# Patient Record
Sex: MALE | Race: BLACK OR AFRICAN AMERICAN | NOT HISPANIC OR LATINO | Employment: UNEMPLOYED | URBAN - METROPOLITAN AREA
[De-identification: names, ages, dates, MRNs, and addresses within clinical notes are randomized per-mention and may not be internally consistent; named-entity substitution may affect disease eponyms.]

---

## 2019-12-19 ENCOUNTER — HOSPITAL ENCOUNTER (EMERGENCY)
Facility: HOSPITAL | Age: 24
Discharge: LEFT BEFORE DISCHARGE | End: 2019-12-19
Attending: EMERGENCY MEDICINE | Admitting: EMERGENCY MEDICINE

## 2019-12-19 VITALS
TEMPERATURE: 96.4 F | OXYGEN SATURATION: 95 % | RESPIRATION RATE: 18 BRPM | HEIGHT: 70 IN | SYSTOLIC BLOOD PRESSURE: 124 MMHG | DIASTOLIC BLOOD PRESSURE: 70 MMHG | HEART RATE: 86 BPM

## 2019-12-19 DIAGNOSIS — K08.89 PAIN, DENTAL: Primary | ICD-10-CM

## 2019-12-19 PROCEDURE — 99282 EMERGENCY DEPT VISIT SF MDM: CPT

## 2019-12-20 NOTE — ED NOTES
WENT TO SPEAK WITH THE PT.  HE NOR THE WOMAN HE WAS WITH ARE IN THE ROOM OR TO BE FOUND AROUND THE ED. PT ELOPED AND DID NOT RECEIVE AVS OR DISCHARGE INSTRUCTIONS      Samson Gutierrez RN  12/19/19 0749

## 2019-12-20 NOTE — ED PROVIDER NOTES
I went to see patient and patient was not present in the room.  There is no clothing or personal effects in the room.  I suspect that patient has eloped from the emergency department.     Rom Arenas MD  12/19/19 0595

## 2019-12-20 NOTE — ED NOTES
Pt ambulatory to triage with c/o feeling like his tooth is falling out - states thinks it is his left upper wisdom tooth, started just prior to arrival to ER.      America Acevedo, RN  12/19/19 1574

## 2024-08-28 NOTE — ED PROVIDER NOTES
" EMERGENCY DEPARTMENT ENCOUNTER    Room Number:  11/11  Date of encounter:  12/19/2019  PCP: Provider, No Known  Historian: patient      HPI:  Chief Complaint: \"my tooth needs to come out\"  Context: Adalberto Leary is a 24 y.o. male who presents to the ED c/o a broken wisdom tooth that has been present for one week and tonight he was eating [employer] and it broke and is now \"moving\" in his mouth.  He states he needs this tooth removed tonight.  He has a dentist but has not seen an oral surgeon about this manner.  He denies fever, gum pain or other dental issues prior to this.  Made worse by eating.       MEDICAL HISTORY REVIEW      PAST MEDICAL HISTORY  Active Ambulatory Problems     Diagnosis Date Noted   • No Active Ambulatory Problems     Resolved Ambulatory Problems     Diagnosis Date Noted   • No Resolved Ambulatory Problems     No Additional Past Medical History         PAST SURGICAL HISTORY  No past surgical history on file.      FAMILY HISTORY  No family history on file.      SOCIAL HISTORY  Social History     Socioeconomic History   • Marital status: Single     Spouse name: Not on file   • Number of children: Not on file   • Years of education: Not on file   • Highest education level: Not on file         ALLERGIES  Patient has no known allergies.        REVIEW OF SYSTEMS  Review of Systems     All systems reviewed and negative except for those discussed in HPI.       PHYSICAL EXAM    I have reviewed the triage vital signs and nursing notes.    ED Triage Vitals   Temp Heart Rate Resp BP SpO2   12/19/19 2159 12/19/19 2159 12/19/19 2159 12/19/19 2230 12/19/19 2159   96.4 °F (35.8 °C) 86 18 124/70 98 %      Temp src Heart Rate Source Patient Position BP Location FiO2 (%)   12/19/19 2159 12/19/19 2159 -- -- --   Tympanic Monitor          GENERAL: alert, in room playing game on cell phone  HENT: nares patent, no trismus, cracked right lower third molar, left upper third molar intact, but painful to patient when I " palpate.  There is no fluctuance, no erythema to gum line.   EYES: no scleral icterus  CV: regular rhythm, regular rate  RESPIRATORY: normal effort  NEURO: alert, moves all extremities, follows commands  SKIN: warm, dry    PROCEDURES    Procedures      MEDICATIONS GIVEN IN ER    Medications - No data to display      PROGRESS, DATA ANALYSIS, CONSULTS, AND MEDICAL DECISION MAKING    Discussed with Dr. Arenas @ 2249.  Pt eloped prior to Dr. Arenas performing a physical exam.  I discussed with patient that we did not pull teeth in ED but that I would refer him to an oral surgeon.     Discussion below represents my analysis of pertinent findings related to patient's condition, differential diagnosis, treatment plan and final disposition.           AS OF 10:52 PM VITALS:    BP - 124/70  HR - 86  TEMP - 96.4 °F (35.8 °C) (Tympanic)  O2 SATS - 95%        DIAGNOSIS  Final diagnoses:   Pain, dental         DISPOSITION  Eloped prior to receiving discharge nia.              Yolanda Maher, APRN  12/19/19 8713     5